# Patient Record
Sex: FEMALE | Race: BLACK OR AFRICAN AMERICAN | NOT HISPANIC OR LATINO | URBAN - METROPOLITAN AREA
[De-identification: names, ages, dates, MRNs, and addresses within clinical notes are randomized per-mention and may not be internally consistent; named-entity substitution may affect disease eponyms.]

---

## 2017-01-18 ENCOUNTER — OUTPATIENT (OUTPATIENT)
Dept: OUTPATIENT SERVICES | Facility: HOSPITAL | Age: 37
LOS: 1 days | Discharge: HOME | End: 2017-01-18

## 2017-01-18 DIAGNOSIS — Z98.89 OTHER SPECIFIED POSTPROCEDURAL STATES: Chronic | ICD-10-CM

## 2017-06-27 DIAGNOSIS — R53.82 CHRONIC FATIGUE, UNSPECIFIED: ICD-10-CM

## 2017-06-27 DIAGNOSIS — R10.11 RIGHT UPPER QUADRANT PAIN: ICD-10-CM

## 2020-04-30 ENCOUNTER — MESSAGE (OUTPATIENT)
Age: 40
End: 2020-04-30

## 2024-12-13 ENCOUNTER — EMERGENCY (EMERGENCY)
Facility: HOSPITAL | Age: 44
LOS: 0 days | Discharge: HOME | End: 2024-12-14
Attending: STUDENT IN AN ORGANIZED HEALTH CARE EDUCATION/TRAINING PROGRAM
Payer: SELF-PAY

## 2024-12-13 VITALS
WEIGHT: 182.1 LBS | HEART RATE: 76 BPM | DIASTOLIC BLOOD PRESSURE: 80 MMHG | SYSTOLIC BLOOD PRESSURE: 136 MMHG | OXYGEN SATURATION: 99 % | TEMPERATURE: 98 F | RESPIRATION RATE: 18 BRPM | HEIGHT: 63 IN

## 2024-12-13 VITALS
SYSTOLIC BLOOD PRESSURE: 119 MMHG | HEART RATE: 67 BPM | OXYGEN SATURATION: 98 % | DIASTOLIC BLOOD PRESSURE: 78 MMHG | TEMPERATURE: 98 F | RESPIRATION RATE: 16 BRPM

## 2024-12-13 DIAGNOSIS — Z88.0 ALLERGY STATUS TO PENICILLIN: ICD-10-CM

## 2024-12-13 DIAGNOSIS — N88.9 NONINFLAMMATORY DISORDER OF CERVIX UTERI, UNSPECIFIED: ICD-10-CM

## 2024-12-13 DIAGNOSIS — R10.2 PELVIC AND PERINEAL PAIN: ICD-10-CM

## 2024-12-13 DIAGNOSIS — Z98.89 OTHER SPECIFIED POSTPROCEDURAL STATES: Chronic | ICD-10-CM

## 2024-12-13 DIAGNOSIS — D25.9 LEIOMYOMA OF UTERUS, UNSPECIFIED: ICD-10-CM

## 2024-12-13 DIAGNOSIS — Z98.891 HISTORY OF UTERINE SCAR FROM PREVIOUS SURGERY: ICD-10-CM

## 2024-12-13 LAB
ALBUMIN SERPL ELPH-MCNC: 3.7 G/DL — SIGNIFICANT CHANGE UP (ref 3.3–5)
ALP SERPL-CCNC: 103 U/L — SIGNIFICANT CHANGE UP (ref 40–120)
ALT FLD-CCNC: 26 U/L — SIGNIFICANT CHANGE UP (ref 12–78)
ANION GAP SERPL CALC-SCNC: 5 MMOL/L — SIGNIFICANT CHANGE UP (ref 5–17)
APPEARANCE UR: CLEAR — SIGNIFICANT CHANGE UP
AST SERPL-CCNC: 14 U/L — LOW (ref 15–37)
BACTERIA # UR AUTO: NEGATIVE /HPF — SIGNIFICANT CHANGE UP
BASOPHILS # BLD AUTO: 0.04 K/UL — SIGNIFICANT CHANGE UP (ref 0–0.2)
BASOPHILS NFR BLD AUTO: 0.4 % — SIGNIFICANT CHANGE UP (ref 0–2)
BILIRUB SERPL-MCNC: 0.5 MG/DL — SIGNIFICANT CHANGE UP (ref 0.2–1.2)
BILIRUB UR-MCNC: NEGATIVE — SIGNIFICANT CHANGE UP
BUN SERPL-MCNC: 12 MG/DL — SIGNIFICANT CHANGE UP (ref 7–23)
CALCIUM SERPL-MCNC: 9.3 MG/DL — SIGNIFICANT CHANGE UP (ref 8.5–10.1)
CHLORIDE SERPL-SCNC: 108 MMOL/L — SIGNIFICANT CHANGE UP (ref 96–108)
CO2 SERPL-SCNC: 26 MMOL/L — SIGNIFICANT CHANGE UP (ref 22–31)
COLOR SPEC: YELLOW — SIGNIFICANT CHANGE UP
CREAT SERPL-MCNC: 0.92 MG/DL — SIGNIFICANT CHANGE UP (ref 0.5–1.3)
DIFF PNL FLD: ABNORMAL
EGFR: 79 ML/MIN/1.73M2 — SIGNIFICANT CHANGE UP
EOSINOPHIL # BLD AUTO: 0.1 K/UL — SIGNIFICANT CHANGE UP (ref 0–0.5)
EOSINOPHIL NFR BLD AUTO: 0.9 % — SIGNIFICANT CHANGE UP (ref 0–6)
EPI CELLS # UR: PRESENT
GLUCOSE SERPL-MCNC: 95 MG/DL — SIGNIFICANT CHANGE UP (ref 70–99)
GLUCOSE UR QL: NEGATIVE MG/DL — SIGNIFICANT CHANGE UP
HCG SERPL-ACNC: <1 MIU/ML — SIGNIFICANT CHANGE UP
HCT VFR BLD CALC: 38.2 % — SIGNIFICANT CHANGE UP (ref 34.5–45)
HGB BLD-MCNC: 12.5 G/DL — SIGNIFICANT CHANGE UP (ref 11.5–15.5)
IMM GRANULOCYTES NFR BLD AUTO: 0.4 % — SIGNIFICANT CHANGE UP (ref 0–0.9)
KETONES UR-MCNC: NEGATIVE MG/DL — SIGNIFICANT CHANGE UP
LEUKOCYTE ESTERASE UR-ACNC: NEGATIVE — SIGNIFICANT CHANGE UP
LYMPHOCYTES # BLD AUTO: 2.93 K/UL — SIGNIFICANT CHANGE UP (ref 1–3.3)
LYMPHOCYTES # BLD AUTO: 25.8 % — SIGNIFICANT CHANGE UP (ref 13–44)
MCHC RBC-ENTMCNC: 24.7 PG — LOW (ref 27–34)
MCHC RBC-ENTMCNC: 32.7 G/DL — SIGNIFICANT CHANGE UP (ref 32–36)
MCV RBC AUTO: 75.3 FL — LOW (ref 80–100)
MONOCYTES # BLD AUTO: 0.91 K/UL — HIGH (ref 0–0.9)
MONOCYTES NFR BLD AUTO: 8 % — SIGNIFICANT CHANGE UP (ref 2–14)
NEUTROPHILS # BLD AUTO: 7.32 K/UL — SIGNIFICANT CHANGE UP (ref 1.8–7.4)
NEUTROPHILS NFR BLD AUTO: 64.5 % — SIGNIFICANT CHANGE UP (ref 43–77)
NITRITE UR-MCNC: NEGATIVE — SIGNIFICANT CHANGE UP
NRBC # BLD: 0 /100 WBCS — SIGNIFICANT CHANGE UP (ref 0–0)
PH UR: 5.5 — SIGNIFICANT CHANGE UP (ref 5–8)
PLATELET # BLD AUTO: 293 K/UL — SIGNIFICANT CHANGE UP (ref 150–400)
POTASSIUM SERPL-MCNC: 4.1 MMOL/L — SIGNIFICANT CHANGE UP (ref 3.5–5.3)
POTASSIUM SERPL-SCNC: 4.1 MMOL/L — SIGNIFICANT CHANGE UP (ref 3.5–5.3)
PROT SERPL-MCNC: 7.6 GM/DL — SIGNIFICANT CHANGE UP (ref 6–8.3)
PROT UR-MCNC: NEGATIVE MG/DL — SIGNIFICANT CHANGE UP
RBC # BLD: 5.07 M/UL — SIGNIFICANT CHANGE UP (ref 3.8–5.2)
RBC # FLD: 14 % — SIGNIFICANT CHANGE UP (ref 10.3–14.5)
RBC CASTS # UR COMP ASSIST: 3 /HPF — SIGNIFICANT CHANGE UP (ref 0–4)
SODIUM SERPL-SCNC: 139 MMOL/L — SIGNIFICANT CHANGE UP (ref 135–145)
SP GR SPEC: 1.02 — SIGNIFICANT CHANGE UP (ref 1–1.03)
UROBILINOGEN FLD QL: 0.2 MG/DL — SIGNIFICANT CHANGE UP (ref 0.2–1)
WBC # BLD: 11.34 K/UL — HIGH (ref 3.8–10.5)
WBC # FLD AUTO: 11.34 K/UL — HIGH (ref 3.8–10.5)
WBC UR QL: 1 /HPF — SIGNIFICANT CHANGE UP (ref 0–5)

## 2024-12-13 PROCEDURE — 99285 EMERGENCY DEPT VISIT HI MDM: CPT

## 2024-12-13 PROCEDURE — 76830 TRANSVAGINAL US NON-OB: CPT | Mod: 26

## 2024-12-13 RX ORDER — SODIUM CHLORIDE 9 MG/ML
1000 INJECTION, SOLUTION INTRAMUSCULAR; INTRAVENOUS; SUBCUTANEOUS ONCE
Refills: 0 | Status: COMPLETED | OUTPATIENT
Start: 2024-12-13 | End: 2024-12-13

## 2024-12-13 RX ORDER — ACETAMINOPHEN 500MG 500 MG/1
1000 TABLET, COATED ORAL ONCE
Refills: 0 | Status: COMPLETED | OUTPATIENT
Start: 2024-12-13 | End: 2024-12-13

## 2024-12-13 RX ADMIN — ACETAMINOPHEN 500MG 1000 MILLIGRAM(S): 500 TABLET, COATED ORAL at 20:28

## 2024-12-13 RX ADMIN — ACETAMINOPHEN 500MG 400 MILLIGRAM(S): 500 TABLET, COATED ORAL at 19:58

## 2024-12-13 RX ADMIN — SODIUM CHLORIDE 1000 MILLILITER(S): 9 INJECTION, SOLUTION INTRAMUSCULAR; INTRAVENOUS; SUBCUTANEOUS at 19:58

## 2024-12-13 NOTE — ED PROVIDER NOTE - NSFOLLOWUPINSTRUCTIONS_ED_ALL_ED_FT
You were seen in the ED for pelvic pain.    Your ultrasound showed a hydrocervix which is non specific but needs close follow up.    Please follow with Dr. Mendieta or your own GYN in the office as you likely need further testing.    If you have new or concerning symptoms, please return to the ED more urgently. You were seen in the ED for pelvic pain.    Your ultrasound showed a hydrocervix which is non specific but needs close follow up.    Please follow with Dr. Mendieta on Monday, call office for appointment.    If you have new or concerning symptoms, please return to the ED more urgently.

## 2024-12-13 NOTE — ED PROVIDER NOTE - OBJECTIVE STATEMENT
43 y/o F with PMH fibroids presenting to the ED w/ abd pain. Patient states she was on her period but it was shorter than her regular cycle. She began having cramping pain last night which radiated to her lower back. She had fibroid surgery in 2017 and pain feels similar to her fibroid pain at that time. No active bleeding or vaginal bleeding. No dysuria, hematuria, N/V/D, fever, or chills.

## 2024-12-13 NOTE — ED PROVIDER NOTE - CLINICAL SUMMARY MEDICAL DECISION MAKING FREE TEXT BOX
43 y/o F with PMH fibroids presenting to the ED w/ abd pain.   Vitals stable.  She is well appearing in NAD.  She has mild suprapubic tenderness on exam.  Suspect fibroid vs uti vs other acute cause of pelvic pain  Will obtain labs and TVUS to evaluate for acute pathology  Pain control  Reassess following labs/imaging 45 y/o F with PMH fibroids presenting to the ED w/ abd pain.   Vitals stable.  She is well appearing in NAD.  She has mild suprapubic tenderness on exam.  Suspect fibroid vs uti vs other acute cause of pelvic pain  Will obtain labs and TVUS to evaluate for acute pathology  Pain control  Reassess following labs/imaging    RW BLANCO Teixeira NP: 43 yo female PMH uterine fibroids s/p removal 2017, A1, follows with OBGYN in [place] doesn't recall name presenting to ED c/o 1 day suprapubic abdominal pain. LMP 3 days ago on Tuesday, states spontaneously stopped bleeding yesterday. Denies any urinary symptoms, abdominal pain, nausea, vomiting, diarrhea, fevers/chills. Lab results unremarkable, urine pending. TVUS reads "Complex hydro-cervix, of unknown clinical significance. This could be due to stenosis/stricturing at the level of the external cervical os, obstructive mass, or it could represent a primary neoplasm itself centered within the cervix. Gynecology evaluation is recommended." GYN consulted, eval pending. Patient updated on results.

## 2024-12-13 NOTE — ED ADULT NURSE NOTE - CAS TRG GENERAL AIRWAY, MLM
Called and spoke with the patient about the above.  She verbalized understanding.  She verbalized concern of taking the Bactrim.  She is planning on calling the Urologist to find out if she needs to take it and if there is anything else that she could possibly take.  She stated that she would call the [facility] if she does. In fact, start taking the Bactrim.    Aide   Patent

## 2024-12-13 NOTE — ED PROVIDER NOTE - PHYSICAL EXAMINATION
GENERAL: Awake, alert, NAD  HEENT: NC/AT, moist mucous membranes  LUNGS: CTAB, no wheezes or crackles   CARDIAC: RRR, no m/r/g  ABDOMEN: Soft, mild suprapubic tenderness, non distended, no rebound, no guarding  BACK: No midline spinal tenderness, no CVA tenderness  EXT: No edema, no deformities.  NEURO: A&Ox3. Moving all extremities.  SKIN: Warm and dry. No rash.  PSYCH: Normal affect.

## 2024-12-13 NOTE — ED ADULT TRIAGE NOTE - CHIEF COMPLAINT QUOTE
pt c/o lower abdominal pain since last night. also c/o slight lower  back pain.  denies n/v/d or gu symptoms. history of fibroids. lmp 12/10.

## 2024-12-13 NOTE — ED ADULT NURSE NOTE - NSFALLUNIVINTERV_ED_ALL_ED
Bed/Stretcher in lowest position, wheels locked, appropriate side rails in place/Call bell, personal items and telephone in reach/Instruct patient to call for assistance before getting out of bed/chair/stretcher/Non-slip footwear applied when patient is off stretcher/Ina to call system/Physically safe environment - no spills, clutter or unnecessary equipment/Purposeful proactive rounding/Room/bathroom lighting operational, light cord in reach

## 2024-12-13 NOTE — ED PROVIDER NOTE - PROGRESS NOTE DETAILS
Ultrasound with complex hydrocervix. Patient was evaluated by GYN, recommend outpatient follow up for colposcopy with Dr. Mendieta.

## 2024-12-13 NOTE — ED PROVIDER NOTE - CARE PROVIDER_API CALL
Angely Moss  Obstetrics and Gynecology  [address] [place] [address]  Phone: [phone]  Fax: [phone]  Follow Up Time: 1-3 Days

## 2024-12-13 NOTE — ED ADULT NURSE NOTE - OBJECTIVE STATEMENT
pt is AOx3, ambulatory with steady gait. pt presents to the ED tonight with complaints of lower abdominal pain that started last night. pt denies any N/V/D. pt denies recent travel or sick contact. pt denies any urinary symptoms. pt reports radiation of pain to her lower back. pt rates her pain a 7/10, pt denies taking medication PTA in the ED tonight. pt denies any CP, SOB, fever, chills at this time. pt has pmh of fibroids. pt states LMP was 12/10.

## 2024-12-13 NOTE — ED PROVIDER NOTE - PATIENT PORTAL LINK FT
You can access the FollowMyHealth Patient Portal offered by [facility] by registering at the following website: [email or web address]. By joining [facility]’s FollowMyHealth portal, you will also be able to view your health information using other applications (apps) compatible with our system.

## 2024-12-14 NOTE — CONSULT NOTE ADULT - SUBJECTIVE AND OBJECTIVE BOX
Chief Complaint:  Patient is a 44y old  Female who presents with a chief complaint of abd pain    HPI: 44F with h/o uterine fibroids, myomectomy 2017 presents for pain at midline, suprapubic area, felt earlier today.  Pt denies dysuria, hematuria, vaginal bleeding.  Hasn't had a pap smear in 5 years, denies any h/o abnormal paps or colposcopy.        PMH/PSH:PAST MEDICAL & SURGICAL HISTORY:  Fibroid      Asthma  (Last exacerbation ~age 17)      History of removal of neck cyst  (benign per pt)          Allergies:  penicillin (Unknown)      Medications:      REVIEW OF SYSTEMS:  All other review of systems is negative unless indicated above.    Relevant Family History:   FAMILY HISTORY:  No pertinent family history in first degree relatives        Relevant Social History:  Denies ETOH or tobacco history    Physical Exam:    Vital Signs:  Vital Signs Last 24 Hrs  T(C): 36.7 (13 Dec 2024 23:25), Max: 36.8 (13 Dec 2024 17:20)  T(F): 98.1 (13 Dec 2024 23:25), Max: 98.3 (13 Dec 2024 17:20)  HR: 67 (13 Dec 2024 23:25) (67 - 76)  BP: 119/78 (13 Dec 2024 23:25) (116/78 - 136/80)  BP(mean): --  RR: 16 (13 Dec 2024 23:25) (16 - 18)  SpO2: 98% (13 Dec 2024 23:25) (98% - 99%)    Parameters below as of 13 Dec 2024 23:25  Patient On (Oxygen Delivery Method): room air      Daily Height in cm: 160.02 (13 Dec 2024 17:20)    Daily     Constitutional: WDWN resting comfortably in bed; NAD  Respiratory: Breathing normally  Gastrointestinal: soft, NT/ND; no rebound or guarding;       Imaging:    < from: US Transvaginal (12.13.24 @ 21:35) >      INTERPRETATION:  CLINICAL INFORMATION: Pelvic pain.    LMP: 12/10/2024    COMPARISON: None available.    TECHNIQUE:  Endovaginal and transabdominal pelvic sonogram. Color and Spectral   Doppler was performed.    FINDINGS:  Uterus: 15.1 cm x 2.1 cm x 7.6 cm. multiple leiomyomas, with the largest   measuring up to 4.2 cm x 4.0 cm x 3.6 cm.  Endometrium: 9 mm. nonspecific simple appearing fluid seen within the   endometrial canal.    There is a complex fluid collection with nodular peripheral components,   and mild peripheral vascular flow, centered within the markedly distended   cervix. It measures up to 5.4 cm x 4.3 cm x 4.4 cm.    Right ovary: Not visible.  Left ovary: 2.6 cm x 1.8 cm x 2.1 cm. Within normal limits. Normal   arterial and venous waveforms.    Fluid: None.    IMPRESSION:  Complex hydro-cervix, of unknown clinical significance. This could be due   to stenosis/stricturing at the level of the external cervical os,   obstructive mass, or it could represent a primary neoplasm itself   centered within the cervix. Gynecology evaluation is recommended.        < end of copied text >

## 2024-12-14 NOTE — CONSULT NOTE ADULT - ASSESSMENT
44F with h/o uterine fibroids and myomectomy   no pap smear in 5 years, presenting for suprapubic pain  US shows     Complex hydro-cervix, of unknown clinical significance. This could be due   to stenosis/stricturing at the level of the external cervical os,   obstructive mass, or it could represent a primary neoplasm itself   centered within the cervix. Gynecology evaluation is recommended.    recommend pain control PRN  f/u with Dr Mendieta as outpatient this week  Discussed with Dr Mendieta